# Patient Record
Sex: FEMALE | Race: WHITE | URBAN - METROPOLITAN AREA
[De-identification: names, ages, dates, MRNs, and addresses within clinical notes are randomized per-mention and may not be internally consistent; named-entity substitution may affect disease eponyms.]

---

## 2017-09-20 ENCOUNTER — FOLLOW UP (OUTPATIENT)
Dept: URBAN - METROPOLITAN AREA CLINIC 33 | Facility: CLINIC | Age: 52
End: 2017-09-20

## 2017-09-20 VITALS
BODY MASS INDEX: 33.75 KG/M2 | WEIGHT: 210 LBS | SYSTOLIC BLOOD PRESSURE: 120 MMHG | HEIGHT: 66 IN | HEART RATE: 80 BPM | DIASTOLIC BLOOD PRESSURE: 93 MMHG

## 2017-09-20 DIAGNOSIS — H26.491: ICD-10-CM

## 2017-09-20 DIAGNOSIS — H43.812: ICD-10-CM

## 2017-09-20 DIAGNOSIS — H33.001: ICD-10-CM

## 2017-09-20 DIAGNOSIS — H35.371: ICD-10-CM

## 2017-09-20 DIAGNOSIS — H35.413: ICD-10-CM

## 2017-09-20 DIAGNOSIS — H04.123: ICD-10-CM

## 2017-09-20 PROCEDURE — 92014 COMPRE OPH EXAM EST PT 1/>: CPT

## 2017-09-20 PROCEDURE — 92250 FUNDUS PHOTOGRAPHY W/I&R: CPT

## 2017-09-20 PROCEDURE — 92226 OPHTHALMOSCOPY (SUB): CPT

## 2017-09-20 ASSESSMENT — VISUAL ACUITY
OS_SC: 20/20-2
OD_SC: 20/25+2

## 2017-09-20 ASSESSMENT — TONOMETRY
OD_IOP_MMHG: 15
OS_IOP_MMHG: 13

## 2018-10-15 ENCOUNTER — FOLLOW UP (OUTPATIENT)
Dept: URBAN - METROPOLITAN AREA CLINIC 33 | Facility: CLINIC | Age: 53
End: 2018-10-15

## 2018-10-15 VITALS — BODY MASS INDEX: 32.14 KG/M2 | HEIGHT: 66 IN | WEIGHT: 200 LBS

## 2018-10-15 DIAGNOSIS — H43.812: ICD-10-CM

## 2018-10-15 DIAGNOSIS — H35.371: ICD-10-CM

## 2018-10-15 DIAGNOSIS — H35.413: ICD-10-CM

## 2018-10-15 DIAGNOSIS — H33.001: ICD-10-CM

## 2018-10-15 PROCEDURE — 92250 FUNDUS PHOTOGRAPHY W/I&R: CPT

## 2018-10-15 PROCEDURE — 92014 COMPRE OPH EXAM EST PT 1/>: CPT

## 2018-10-15 PROCEDURE — 92226 OPHTHALMOSCOPY (SUB): CPT

## 2018-10-15 ASSESSMENT — TONOMETRY
OS_IOP_MMHG: 16
OD_IOP_MMHG: 15

## 2018-10-15 ASSESSMENT — VISUAL ACUITY
OS_SC: 20/25+1
OD_SC: 20/25+1

## 2020-11-11 ENCOUNTER — FOLLOW UP (OUTPATIENT)
Dept: URBAN - METROPOLITAN AREA CLINIC 33 | Facility: CLINIC | Age: 55
End: 2020-11-11

## 2020-11-11 VITALS — HEIGHT: 66 IN | WEIGHT: 220 LBS | BODY MASS INDEX: 35.36 KG/M2

## 2020-11-11 DIAGNOSIS — H33.001: ICD-10-CM

## 2020-11-11 DIAGNOSIS — H43.812: ICD-10-CM

## 2020-11-11 DIAGNOSIS — H35.371: ICD-10-CM

## 2020-11-11 DIAGNOSIS — H35.413: ICD-10-CM

## 2020-11-11 PROCEDURE — 92250 FUNDUS PHOTOGRAPHY W/I&R: CPT

## 2020-11-11 PROCEDURE — 92014 COMPRE OPH EXAM EST PT 1/>: CPT

## 2020-11-11 ASSESSMENT — VISUAL ACUITY
OS_PH: 20/20-1
OD_PH: 20/30-2
OD_SC: 20/60-2
OS_SC: 20/40-2

## 2020-11-11 ASSESSMENT — TONOMETRY
OD_IOP_MMHG: 16
OS_IOP_MMHG: 15

## 2021-11-23 ENCOUNTER — FOLLOW UP (OUTPATIENT)
Dept: URBAN - METROPOLITAN AREA CLINIC 33 | Facility: CLINIC | Age: 56
End: 2021-11-23

## 2021-11-23 VITALS — BODY MASS INDEX: 33.32 KG/M2 | WEIGHT: 200 LBS | HEIGHT: 65 IN

## 2021-11-23 DIAGNOSIS — H33.001: ICD-10-CM

## 2021-11-23 DIAGNOSIS — H04.123: ICD-10-CM

## 2021-11-23 DIAGNOSIS — H35.411: ICD-10-CM

## 2021-11-23 DIAGNOSIS — H43.812: ICD-10-CM

## 2021-11-23 DIAGNOSIS — H35.371: ICD-10-CM

## 2021-11-23 DIAGNOSIS — H35.412: ICD-10-CM

## 2021-11-23 PROCEDURE — 92014 COMPRE OPH EXAM EST PT 1/>: CPT

## 2021-11-23 PROCEDURE — 92134 CPTRZ OPH DX IMG PST SGM RTA: CPT

## 2021-11-23 ASSESSMENT — VISUAL ACUITY
OS_SC: 20/40
OD_SC: 20/25+2
OS_PH: 20/20-2

## 2021-11-23 ASSESSMENT — TONOMETRY
OD_IOP_MMHG: 16
OS_IOP_MMHG: 14

## 2021-12-22 ENCOUNTER — FOLLOW UP (OUTPATIENT)
Dept: URBAN - METROPOLITAN AREA CLINIC 33 | Facility: CLINIC | Age: 56
End: 2021-12-22

## 2021-12-22 DIAGNOSIS — H43.812: ICD-10-CM

## 2021-12-22 DIAGNOSIS — H35.411: ICD-10-CM

## 2021-12-22 DIAGNOSIS — H35.412: ICD-10-CM

## 2021-12-22 DIAGNOSIS — H33.001: ICD-10-CM

## 2021-12-22 DIAGNOSIS — H04.123: ICD-10-CM

## 2021-12-22 DIAGNOSIS — H35.371: ICD-10-CM

## 2021-12-22 PROCEDURE — 92201 OPSCPY EXTND RTA DRAW UNI/BI: CPT

## 2021-12-22 PROCEDURE — 92012 INTRM OPH EXAM EST PATIENT: CPT

## 2021-12-22 ASSESSMENT — VISUAL ACUITY
OS_SC: 20/30+2
OD_SC: 20/25-2

## 2021-12-22 ASSESSMENT — TONOMETRY
OD_IOP_MMHG: 17
OS_IOP_MMHG: 18

## 2022-12-21 ENCOUNTER — COMPREHENSIVE EXAM (OUTPATIENT)
Dept: URBAN - METROPOLITAN AREA CLINIC 33 | Facility: CLINIC | Age: 57
End: 2022-12-21

## 2022-12-21 VITALS — BODY MASS INDEX: 33.32 KG/M2 | HEIGHT: 65 IN | WEIGHT: 200 LBS

## 2022-12-21 PROCEDURE — 92014 COMPRE OPH EXAM EST PT 1/>: CPT

## 2022-12-21 PROCEDURE — 92134 CPTRZ OPH DX IMG PST SGM RTA: CPT

## 2022-12-21 PROCEDURE — 92250 FUNDUS PHOTOGRAPHY W/I&R: CPT

## 2022-12-21 ASSESSMENT — VISUAL ACUITY
OS_SC: 20/30+2
OD_SC: 20/20-2

## 2022-12-21 ASSESSMENT — TONOMETRY
OS_IOP_MMHG: 14
OD_IOP_MMHG: 18

## 2023-12-20 ENCOUNTER — FOLLOW UP (OUTPATIENT)
Dept: URBAN - METROPOLITAN AREA CLINIC 33 | Facility: CLINIC | Age: 58
End: 2023-12-20

## 2023-12-20 VITALS — BODY MASS INDEX: 35.36 KG/M2 | WEIGHT: 220 LBS | HEIGHT: 66 IN

## 2023-12-20 DIAGNOSIS — H43.812: ICD-10-CM

## 2023-12-20 DIAGNOSIS — H33.001: ICD-10-CM

## 2023-12-20 DIAGNOSIS — H35.373: ICD-10-CM

## 2023-12-20 DIAGNOSIS — H35.413: ICD-10-CM

## 2023-12-20 DIAGNOSIS — H04.123: ICD-10-CM

## 2023-12-20 PROCEDURE — 92014 COMPRE OPH EXAM EST PT 1/>: CPT

## 2023-12-20 PROCEDURE — 92134 CPTRZ OPH DX IMG PST SGM RTA: CPT

## 2023-12-20 PROCEDURE — 92250 FUNDUS PHOTOGRAPHY W/I&R: CPT

## 2023-12-20 ASSESSMENT — TONOMETRY
OS_IOP_MMHG: 11
OD_IOP_MMHG: 18

## 2023-12-20 ASSESSMENT — VISUAL ACUITY
OS_PH: 20/20
OS_SC: 20/50+2
OD_SC: 20/20

## 2024-11-25 ENCOUNTER — LAB OUTSIDE AN ENCOUNTER (OUTPATIENT)
Dept: URBAN - METROPOLITAN AREA CLINIC 68 | Facility: CLINIC | Age: 59
End: 2024-11-25

## 2024-11-25 ENCOUNTER — OFFICE VISIT (OUTPATIENT)
Dept: URBAN - METROPOLITAN AREA CLINIC 68 | Facility: CLINIC | Age: 59
End: 2024-11-25
Payer: COMMERCIAL

## 2024-11-25 ENCOUNTER — DASHBOARD ENCOUNTERS (OUTPATIENT)
Age: 59
End: 2024-11-25

## 2024-11-25 VITALS
HEIGHT: 65 IN | BODY MASS INDEX: 35.99 KG/M2 | DIASTOLIC BLOOD PRESSURE: 90 MMHG | SYSTOLIC BLOOD PRESSURE: 142 MMHG | WEIGHT: 216 LBS

## 2024-11-25 DIAGNOSIS — Z12.11 COLON CANCER SCREENING: ICD-10-CM

## 2024-11-25 DIAGNOSIS — Z86.0100 HISTORY OF COLON POLYPS: ICD-10-CM

## 2024-11-25 DIAGNOSIS — R93.89 IMAGING ABNORMALITY: ICD-10-CM

## 2024-11-25 DIAGNOSIS — K21.9 CHRONIC GERD: ICD-10-CM

## 2024-11-25 DIAGNOSIS — K76.9 HEPATIC LESION: ICD-10-CM

## 2024-11-25 PROBLEM — 235595009: Status: ACTIVE | Noted: 2024-11-25

## 2024-11-25 PROBLEM — 428283002: Status: ACTIVE | Noted: 2024-11-25

## 2024-11-25 PROBLEM — 300331000: Status: ACTIVE | Noted: 2024-11-25

## 2024-11-25 PROBLEM — 168501001: Status: ACTIVE | Noted: 2024-11-25

## 2024-11-25 PROBLEM — 305058001: Status: ACTIVE | Noted: 2024-11-25

## 2024-11-25 PROCEDURE — 99204 OFFICE O/P NEW MOD 45 MIN: CPT | Performed by: INTERNAL MEDICINE

## 2024-11-25 RX ORDER — SODIUM, POTASSIUM,MAG SULFATES 17.5-3.13G
AS DIRECTED SOLUTION, RECONSTITUTED, ORAL ORAL
OUTPATIENT
Start: 2024-11-25

## 2024-11-25 NOTE — HPI-TODAY'S VISIT:
55 y.o. WF with a history of large indeterminate appearing liver lesion in R lobe of liver who is here for evaluation. She describes having a colonoscopy at age 49 y[address] with Dr. Walters and did have a colon polyp, prep was fair. She does have significant reflux and dyspepsia and has been taking Omepazole. She has never had an EGD. She describes having an episode of abdominal pain, n/v in 4/2024 and was noted to have enteritis and finding of large cystic appearing lesion in R lobe of liver. She denies any gib. She is planning to have a gynecologic procedure tomorrow and will plan to schedule her colonoscopy accordingly.

## 2025-02-03 ENCOUNTER — OFFICE VISIT (OUTPATIENT)
Dept: URBAN - METROPOLITAN AREA SURGERY CENTER 12 | Facility: SURGERY CENTER | Age: 60
End: 2025-02-03
Payer: COMMERCIAL

## 2025-02-03 ENCOUNTER — CLAIMS CREATED FROM THE CLAIM WINDOW (OUTPATIENT)
Dept: URBAN - METROPOLITAN AREA CLINIC 4 | Facility: CLINIC | Age: 60
End: 2025-02-03
Payer: COMMERCIAL

## 2025-02-03 DIAGNOSIS — K31.89 OTHER DISEASES OF STOMACH AND DUODENUM: ICD-10-CM

## 2025-02-03 DIAGNOSIS — Z86.0100 PERSONAL HISTORY OF COLON POLYPS, UNSPECIFIED: ICD-10-CM

## 2025-02-03 DIAGNOSIS — K26.9 DUODENAL ULCER, UNSPECIFIED AS ACUTE OR CHRONIC, WITHOUT HEMORRHAGE OR PERFORATION: ICD-10-CM

## 2025-02-03 DIAGNOSIS — K63.89 OTHER SPECIFIED DISEASES OF INTESTINE: ICD-10-CM

## 2025-02-03 DIAGNOSIS — K29.70 GASTRITIS WITHOUT BLEEDING, UNSPECIFIED CHRONICITY, UNSPECIFIED GASTRITIS TYPE: ICD-10-CM

## 2025-02-03 DIAGNOSIS — K22.89 OTHER SPECIFIED DISEASE OF ESOPHAGUS: ICD-10-CM

## 2025-02-03 DIAGNOSIS — K21.9 GASTRO-ESOPHAGEAL REFLUX DISEASE WITHOUT ESOPHAGITIS: ICD-10-CM

## 2025-02-03 DIAGNOSIS — K57.30 DIVERTICULOSIS OF LARGE INTESTINE WITHOUT PERFORATION OR ABSCESS WITHOUT BLEEDING: ICD-10-CM

## 2025-02-03 DIAGNOSIS — K21.9 GASTRO - ESOPHAGEAL REFLUX DISEASE: ICD-10-CM

## 2025-02-03 DIAGNOSIS — K44.9 DIAPHRAGMATIC HERNIA WITHOUT OBSTRUCTION OR GANGRENE: ICD-10-CM

## 2025-02-03 DIAGNOSIS — K25.9 GASTRIC ULCER WITHOUT HEMORRHAGE OR PERFORATION, UNSPECIFIED CHRONICITY: ICD-10-CM

## 2025-02-03 DIAGNOSIS — Z12.11 COLON CANCER SCREENING: ICD-10-CM

## 2025-02-03 DIAGNOSIS — K26.7 CHRONIC DUODENAL ULCER WITHOUT HEMORRHAGE OR PERFORATION: ICD-10-CM

## 2025-02-03 DIAGNOSIS — K64.0 FIRST DEGREE HEMORRHOIDS: ICD-10-CM

## 2025-02-03 PROCEDURE — 0529F INTRVL 3/>YR PTS CLNSCP DOCD: CPT | Performed by: INTERNAL MEDICINE

## 2025-02-03 PROCEDURE — 88342 IMHCHEM/IMCYTCHM 1ST ANTB: CPT | Performed by: PATHOLOGY

## 2025-02-03 PROCEDURE — 88312 SPECIAL STAINS GROUP 1: CPT | Performed by: PATHOLOGY

## 2025-02-03 PROCEDURE — 43239 EGD BIOPSY SINGLE/MULTIPLE: CPT | Performed by: INTERNAL MEDICINE

## 2025-02-03 PROCEDURE — 00813 ANES UPR LWR GI NDSC PX: CPT | Performed by: NURSE ANESTHETIST, CERTIFIED REGISTERED

## 2025-02-03 PROCEDURE — 88341 IMHCHEM/IMCYTCHM EA ADD ANTB: CPT | Performed by: PATHOLOGY

## 2025-02-03 PROCEDURE — 45380 COLONOSCOPY AND BIOPSY: CPT | Performed by: INTERNAL MEDICINE

## 2025-02-03 PROCEDURE — 88305 TISSUE EXAM BY PATHOLOGIST: CPT | Performed by: PATHOLOGY

## 2025-02-03 RX ORDER — PANTOPRAZOLE SODIUM 40 MG/1
1 TABLET 1/2 TO 1 HOUR BEFORE MORNING MEAL TABLET, DELAYED RELEASE ORAL TWICE A DAY
Qty: 120 TABLET | OUTPATIENT
Start: 2025-02-03

## 2025-02-03 RX ORDER — SODIUM, POTASSIUM,MAG SULFATES 17.5-3.13G
AS DIRECTED SOLUTION, RECONSTITUTED, ORAL ORAL
Status: ACTIVE | COMMUNITY
Start: 2024-11-25

## 2025-02-04 ENCOUNTER — TELEPHONE ENCOUNTER (OUTPATIENT)
Dept: URBAN - METROPOLITAN AREA CLINIC 68 | Facility: CLINIC | Age: 60
End: 2025-02-04

## 2025-02-21 ENCOUNTER — OFFICE VISIT (OUTPATIENT)
Dept: URBAN - METROPOLITAN AREA CLINIC 68 | Facility: CLINIC | Age: 60
End: 2025-02-21

## 2025-03-10 ENCOUNTER — TELEPHONE ENCOUNTER (OUTPATIENT)
Dept: URBAN - METROPOLITAN AREA CLINIC 68 | Facility: CLINIC | Age: 60
End: 2025-03-10

## 2025-03-11 ENCOUNTER — OFFICE VISIT (OUTPATIENT)
Dept: URBAN - METROPOLITAN AREA CLINIC 68 | Facility: CLINIC | Age: 60
End: 2025-03-11
Payer: COMMERCIAL

## 2025-03-11 ENCOUNTER — LAB OUTSIDE AN ENCOUNTER (OUTPATIENT)
Dept: URBAN - METROPOLITAN AREA CLINIC 68 | Facility: CLINIC | Age: 60
End: 2025-03-11

## 2025-03-11 VITALS — WEIGHT: 218 LBS | HEIGHT: 65 IN | BODY MASS INDEX: 36.32 KG/M2

## 2025-03-11 DIAGNOSIS — R11.0 NAUSEA: ICD-10-CM

## 2025-03-11 DIAGNOSIS — K21.9 CHRONIC GERD: ICD-10-CM

## 2025-03-11 DIAGNOSIS — D3A.8 NEUROENDOCRINE TUMOR OF LIVER: ICD-10-CM

## 2025-03-11 DIAGNOSIS — K27.9 PEPTIC ULCER DISEASE: ICD-10-CM

## 2025-03-11 PROBLEM — 422587007: Status: ACTIVE | Noted: 2025-03-11

## 2025-03-11 PROBLEM — 1290081007: Status: ACTIVE | Noted: 2025-03-11

## 2025-03-11 PROBLEM — 13200003: Status: ACTIVE | Noted: 2025-03-11

## 2025-03-11 PROCEDURE — 99214 OFFICE O/P EST MOD 30 MIN: CPT

## 2025-03-11 RX ORDER — PANTOPRAZOLE SODIUM 40 MG/1
1 TABLET 1/2 TO 1 HOUR BEFORE MORNING MEAL TABLET, DELAYED RELEASE ORAL TWICE A DAY
Qty: 120 TABLET | Status: ACTIVE | COMMUNITY
Start: 2025-02-03

## 2025-03-11 RX ORDER — SODIUM, POTASSIUM,MAG SULFATES 17.5-3.13G
AS DIRECTED SOLUTION, RECONSTITUTED, ORAL ORAL
Status: ON HOLD | COMMUNITY
Start: 2024-11-25

## 2025-03-11 RX ORDER — ONDANSETRON 4 MG/1
1 TABLET ON THE TONGUE AND ALLOW TO DISSOLVE TABLET, ORALLY DISINTEGRATING ORAL
Qty: 30 TABLET | Refills: 3 | OUTPATIENT
Start: 2025-03-11

## 2025-03-11 NOTE — HPI-TODAY'S VISIT:
59 y.o. WF with a history of large indeterminate appearing liver lesion in R lobe of liver who is here for pathology results s/p EGD and colonoscopy. EGD showed gastric ulcer, duodenal ulcers, gastritis, small hiatal hernia, with biopsies negative for H. pylori or malignancy. Colonoscopy showed diverticulosis and IH, inflammation of iCV. She describes having an episode of abdominal pain, n/v in 4/2024 and was noted to have enteritis and finding of large cystic appearing lesion in R lobe of liver. She denies any gib. She was seen at [facility] after the procedures for n/v. She was found to have hypokalemia which caused an episode of A. flutter. She had a cardiac workup including an echocardiogram that was negative. While there, she had MRI of the liver and biopsy that showed a neuroendocrine tumor of the liver. Will refer to Dr. Santana and [facility]. She will need to continue on Pantoprazole BID and will repeat EGD in 6 weeks. She does refer some nausea. Will send Zofran prn.

## 2025-03-11 NOTE — PHYSICAL EXAM HENT:
Head, normocephalic, atraumatic, Face, Face within normal limits, Ears, External ears within normal limits, Nose/Nasopharynx, External nose normal appearance, nares patent, no nasal discharge , Head, normocephalic, atraumatic, Face, Face within normal limits, Ears, External ears within normal limits, Nose/Nasopharynx, External nose normal appearance, nares patent, no nasal discharge

## 2025-03-13 ENCOUNTER — FOLLOW UP (OUTPATIENT)
Age: 60
End: 2025-03-13

## 2025-03-13 VITALS — WEIGHT: 200 LBS | BODY MASS INDEX: 32.14 KG/M2 | HEIGHT: 66 IN

## 2025-03-13 DIAGNOSIS — H04.123: ICD-10-CM

## 2025-03-13 DIAGNOSIS — H33.001: ICD-10-CM

## 2025-03-13 DIAGNOSIS — H35.413: ICD-10-CM

## 2025-03-13 DIAGNOSIS — H35.373: ICD-10-CM

## 2025-03-13 DIAGNOSIS — H43.812: ICD-10-CM

## 2025-03-13 PROCEDURE — 92250 FUNDUS PHOTOGRAPHY W/I&R: CPT | Mod: 59

## 2025-03-13 PROCEDURE — 92014 COMPRE OPH EXAM EST PT 1/>: CPT

## 2025-03-13 PROCEDURE — 92134 CPTRZ OPH DX IMG PST SGM RTA: CPT

## 2025-04-15 ENCOUNTER — OFFICE VISIT (OUTPATIENT)
Dept: URBAN - METROPOLITAN AREA SURGERY CENTER 12 | Facility: SURGERY CENTER | Age: 60
End: 2025-04-15

## 2025-04-29 ENCOUNTER — OFFICE VISIT (OUTPATIENT)
Dept: URBAN - METROPOLITAN AREA CLINIC 68 | Facility: CLINIC | Age: 60
End: 2025-04-29